# Patient Record
Sex: FEMALE | Race: WHITE | NOT HISPANIC OR LATINO | Employment: FULL TIME | URBAN - METROPOLITAN AREA
[De-identification: names, ages, dates, MRNs, and addresses within clinical notes are randomized per-mention and may not be internally consistent; named-entity substitution may affect disease eponyms.]

---

## 2017-05-04 ENCOUNTER — HOSPITAL ENCOUNTER (EMERGENCY)
Facility: HOSPITAL | Age: 47
Discharge: HOME | End: 2017-05-04
Attending: EMERGENCY MEDICINE | Admitting: EMERGENCY MEDICINE

## 2017-05-04 VITALS
RESPIRATION RATE: 18 BRPM | OXYGEN SATURATION: 100 % | TEMPERATURE: 98 F | WEIGHT: 270 LBS | DIASTOLIC BLOOD PRESSURE: 93 MMHG | SYSTOLIC BLOOD PRESSURE: 142 MMHG | HEART RATE: 83 BPM | BODY MASS INDEX: 42.38 KG/M2 | HEIGHT: 67 IN

## 2017-05-04 DIAGNOSIS — R20.2 PARESTHESIAS: Primary | ICD-10-CM

## 2017-05-04 PROCEDURE — 99283 EMERGENCY DEPT VISIT LOW MDM: CPT | Performed by: EMERGENCY MEDICINE

## 2017-05-04 PROCEDURE — 99282 EMERGENCY DEPT VISIT SF MDM: CPT

## 2017-06-02 ENCOUNTER — OFFICE VISIT (OUTPATIENT)
Dept: SURGERY | Facility: CLINIC | Age: 47
End: 2017-06-02

## 2017-06-02 VITALS — BODY MASS INDEX: 40.5 KG/M2 | HEART RATE: 79 BPM | WEIGHT: 267.2 LBS | OXYGEN SATURATION: 97 % | HEIGHT: 68 IN

## 2017-06-02 DIAGNOSIS — R10.11 RIGHT UPPER QUADRANT ABDOMINAL PAIN: ICD-10-CM

## 2017-06-02 DIAGNOSIS — L72.3 SEBACEOUS CYST: Primary | ICD-10-CM

## 2017-06-02 PROCEDURE — 99203 OFFICE O/P NEW LOW 30 MIN: CPT | Performed by: SURGERY

## 2017-06-02 RX ORDER — LORATADINE 10 MG
TABLET ORAL
Refills: 0 | COMMUNITY
Start: 2017-05-09 | End: 2022-04-21

## 2017-06-02 RX ORDER — OMEPRAZOLE 20 MG/1
CAPSULE, DELAYED RELEASE ORAL
Refills: 0 | COMMUNITY
Start: 2017-05-09 | End: 2022-04-21

## 2017-06-02 RX ORDER — MELATONIN
1000 DAILY
COMMUNITY
End: 2022-04-21

## 2017-06-02 RX ORDER — DICYCLOMINE HYDROCHLORIDE 10 MG/1
CAPSULE ORAL
Refills: 0 | COMMUNITY
Start: 2017-05-09 | End: 2022-04-21

## 2017-06-02 RX ORDER — DOXYCYCLINE 100 MG/1
TABLET ORAL
Refills: 0 | COMMUNITY
Start: 2017-05-26 | End: 2022-04-21

## 2017-06-02 NOTE — PROGRESS NOTES
General Surgery  Initial Office Visit    CC: Back cyst    HPI: The patient is a pleasant 46 y.o. year-old lady who presents today for evaluation of a palpable cyst over the left lower back.  The cyst is been present for many years, although she states that 2 weeks ago it enlarged and began to drain spontaneously after she applied some hot compresses topically.  The drainage was very malodorous, white, and filled with pus and bloody drainage as well.  She went to her primary care physician who prescribed doxycycline.  She states that is never been infected prior to this episode that began 2 weeks ago.  She has a history of a pilonidal cyst that required incision and drainage in an emergency room years back, but was never excised completely.  She also has had some right upper quadrant pain with deep palpation and has had some atypical chest pain for which she is being referred for a stress test soon as well as a right upper quadrant ultrasound.  She denies any nausea, vomiting, fevers, chills, jaundice, or scleral icterus.    Past Medical History: Skin cancer of the right arm, cervical cancer, pilonidal cyst    Past Surgical History: Hysterectomy, wisdom tooth extraction    Medications:   Dicyclomine 10 mg 4 times daily  Omeprazole 20 mg daily  Doxycycline monohydrate 100 mg 2 times daily (recently prescribed for infected cyst)    Allergies: Hydrocodone (itching), penicillin (nausea), Percocet (itching)    Family History: Aunt with breast cancer, great aunt with lung cancer and another great aunt with tongue cancer, maternal grandfather with gallbladder issues, father with coronary artery disease    Social History: Works for the [employer], , smokes 10 cigarettes daily for the last 20 years, no regular alcohol use    ROS:   Constitutional: Negative for fevers or chills  HENT: Positive for sinus pressure and sneezing; Negative for hearing loss or runny nose  Eyes: Negative for vision changes or scleral  icterus  Respiratory: Negative for cough or shortness of breath  Cardiovascular: Negative for chest pain or heart palpitations  Gastrointestinal: Negative for abdominal pain, nausea, vomiting, constipation, melena, or hematochezia  Genitourinary: Negative for hematuria or dysuria  Musculoskeletal: Negative for joint pain or back pain  Neurologic: Negative for headaches or dizziness  Psychiatric: Negative for anxiety or depression  All other systems reviewed and negative    Physical Exam:  Vitals:    06/02/17 0813   Pulse: 79   SpO2: 97%     General: No acute distress, well-nourished & well-developed  HEAD: normocephalic, atraumatic  EYES: normal conjunctiva, sclera anicteric  EARS: grossly normal hearing  NECK: supple, no thyromegaly  CARDIOVASCULAR: regular rate and rhythm  RESPIRATORY: clear to auscultation bilaterally  GASTROINTESTINAL: soft, nontender, non-distended  MUSCULOSKELETAL: normal gait and station. No gross extremity abnormalities  PSYCHIATRIC: oriented x3, normal mood and affect  BACK: Palpable sebaceous cyst of the left lower back with resulting around a small central pore, no expressible drainage and no underlying fluctuance    ASSESSMENT & PLAN  Mrs. Will is a 46-year-old lady with a recently infected sebaceous cyst of the lower back that she wishes to have electively excised.  She is scheduled to undergo a cardiac stress test as well as a gallbladder ultrasound next week at [facility], and would like to wait until both of these studies are complete before scheduling any elective surgical procedures as she would like to delineate whether or not her gallbladder will need to be removed beforehand.  I will follow up on the results of her gallbladder ultrasound and call her after reviewing the report.  If she has not heard from me 1 week after the ultrasound has been complete, she will call me with the results and I will then be happy to schedule her back cyst excision plus or minus  cholecystectomy depending on the findings of her gallbladder ultrasound.    Kaylee Álvarez MD  General and Endoscopic Surgery  [facility]    [address], [address]  [place], [place], [address]  P: [phone]  F: [phone]

## 2017-08-02 DIAGNOSIS — Z95.1 S/P CABG X 1: Primary | ICD-10-CM

## 2017-08-04 ENCOUNTER — TREATMENT (OUTPATIENT)
Dept: CARDIAC REHAB | Facility: HOSPITAL | Age: 47
End: 2017-08-04

## 2017-08-04 DIAGNOSIS — Z95.1 S/P CABG X 1: Primary | ICD-10-CM

## 2017-08-04 PROCEDURE — 93798 PHYS/QHP OP CAR RHAB W/ECG: CPT

## 2017-08-07 ENCOUNTER — TREATMENT (OUTPATIENT)
Dept: CARDIAC REHAB | Facility: HOSPITAL | Age: 47
End: 2017-08-07

## 2017-08-07 DIAGNOSIS — Z95.1 S/P CABG X 1: Primary | ICD-10-CM

## 2017-08-07 PROCEDURE — 93798 PHYS/QHP OP CAR RHAB W/ECG: CPT

## 2017-08-09 ENCOUNTER — TREATMENT (OUTPATIENT)
Dept: CARDIAC REHAB | Facility: HOSPITAL | Age: 47
End: 2017-08-09

## 2017-08-09 DIAGNOSIS — Z95.1 S/P CABG X 1: Primary | ICD-10-CM

## 2017-08-09 PROCEDURE — 93798 PHYS/QHP OP CAR RHAB W/ECG: CPT

## 2017-08-11 ENCOUNTER — TREATMENT (OUTPATIENT)
Dept: CARDIAC REHAB | Facility: HOSPITAL | Age: 47
End: 2017-08-11

## 2017-08-11 DIAGNOSIS — Z95.1 S/P CABG X 1: Primary | ICD-10-CM

## 2017-08-11 PROCEDURE — 93798 PHYS/QHP OP CAR RHAB W/ECG: CPT

## 2017-08-14 ENCOUNTER — TREATMENT (OUTPATIENT)
Dept: CARDIAC REHAB | Facility: HOSPITAL | Age: 47
End: 2017-08-14

## 2017-08-14 DIAGNOSIS — Z95.1 S/P CABG X 1: Primary | ICD-10-CM

## 2017-08-14 PROCEDURE — 93798 PHYS/QHP OP CAR RHAB W/ECG: CPT

## 2017-08-16 ENCOUNTER — TREATMENT (OUTPATIENT)
Dept: CARDIAC REHAB | Facility: HOSPITAL | Age: 47
End: 2017-08-16

## 2017-08-16 DIAGNOSIS — Z95.1 S/P CABG X 1: Primary | ICD-10-CM

## 2017-08-16 PROCEDURE — 93798 PHYS/QHP OP CAR RHAB W/ECG: CPT

## 2017-08-18 ENCOUNTER — TREATMENT (OUTPATIENT)
Dept: CARDIAC REHAB | Facility: HOSPITAL | Age: 47
End: 2017-08-18

## 2017-08-18 DIAGNOSIS — Z95.1 S/P CABG X 1: Primary | ICD-10-CM

## 2017-08-18 PROCEDURE — 93798 PHYS/QHP OP CAR RHAB W/ECG: CPT

## 2017-08-21 ENCOUNTER — TREATMENT (OUTPATIENT)
Dept: CARDIAC REHAB | Facility: HOSPITAL | Age: 47
End: 2017-08-21

## 2017-08-21 DIAGNOSIS — Z95.1 S/P CABG X 1: Primary | ICD-10-CM

## 2017-08-21 PROCEDURE — 93798 PHYS/QHP OP CAR RHAB W/ECG: CPT

## 2017-08-23 ENCOUNTER — TREATMENT (OUTPATIENT)
Dept: CARDIAC REHAB | Facility: HOSPITAL | Age: 47
End: 2017-08-23

## 2017-08-23 DIAGNOSIS — Z95.1 S/P CABG X 1: Primary | ICD-10-CM

## 2017-08-23 PROCEDURE — 93798 PHYS/QHP OP CAR RHAB W/ECG: CPT

## 2017-08-25 ENCOUNTER — TREATMENT (OUTPATIENT)
Dept: CARDIAC REHAB | Facility: HOSPITAL | Age: 47
End: 2017-08-25

## 2017-08-25 DIAGNOSIS — Z95.1 S/P CABG X 1: Primary | ICD-10-CM

## 2017-08-25 PROCEDURE — 93798 PHYS/QHP OP CAR RHAB W/ECG: CPT

## 2017-08-28 ENCOUNTER — TREATMENT (OUTPATIENT)
Dept: CARDIAC REHAB | Facility: HOSPITAL | Age: 47
End: 2017-08-28

## 2017-08-28 DIAGNOSIS — Z95.1 S/P CABG X 1: Primary | ICD-10-CM

## 2017-08-28 PROCEDURE — 93798 PHYS/QHP OP CAR RHAB W/ECG: CPT

## 2017-08-30 ENCOUNTER — TREATMENT (OUTPATIENT)
Dept: CARDIAC REHAB | Facility: HOSPITAL | Age: 47
End: 2017-08-30

## 2017-08-30 DIAGNOSIS — Z95.1 S/P CABG X 1: Primary | ICD-10-CM

## 2017-08-30 PROCEDURE — 93798 PHYS/QHP OP CAR RHAB W/ECG: CPT

## 2017-09-01 ENCOUNTER — TREATMENT (OUTPATIENT)
Dept: CARDIAC REHAB | Facility: HOSPITAL | Age: 47
End: 2017-09-01

## 2017-09-01 DIAGNOSIS — Z95.1 S/P CABG X 1: Primary | ICD-10-CM

## 2017-09-01 PROCEDURE — 93798 PHYS/QHP OP CAR RHAB W/ECG: CPT

## 2017-09-06 ENCOUNTER — TREATMENT (OUTPATIENT)
Dept: CARDIAC REHAB | Facility: HOSPITAL | Age: 47
End: 2017-09-06

## 2017-09-06 DIAGNOSIS — Z95.1 S/P CABG X 1: Primary | ICD-10-CM

## 2017-09-06 PROCEDURE — 93798 PHYS/QHP OP CAR RHAB W/ECG: CPT

## 2017-09-08 ENCOUNTER — TREATMENT (OUTPATIENT)
Dept: CARDIAC REHAB | Facility: HOSPITAL | Age: 47
End: 2017-09-08

## 2017-09-08 DIAGNOSIS — Z95.1 S/P CABG X 1: Primary | ICD-10-CM

## 2017-09-08 PROCEDURE — 93798 PHYS/QHP OP CAR RHAB W/ECG: CPT

## 2017-09-11 ENCOUNTER — TREATMENT (OUTPATIENT)
Dept: CARDIAC REHAB | Facility: HOSPITAL | Age: 47
End: 2017-09-11

## 2017-09-11 DIAGNOSIS — Z95.1 S/P CABG X 1: Primary | ICD-10-CM

## 2017-09-11 PROCEDURE — 93798 PHYS/QHP OP CAR RHAB W/ECG: CPT

## 2017-09-13 ENCOUNTER — TREATMENT (OUTPATIENT)
Dept: CARDIAC REHAB | Facility: HOSPITAL | Age: 47
End: 2017-09-13

## 2017-09-13 DIAGNOSIS — Z95.1 S/P CABG X 1: Primary | ICD-10-CM

## 2017-09-13 PROCEDURE — 93798 PHYS/QHP OP CAR RHAB W/ECG: CPT

## 2017-09-18 ENCOUNTER — TREATMENT (OUTPATIENT)
Dept: CARDIAC REHAB | Facility: HOSPITAL | Age: 47
End: 2017-09-18

## 2017-09-18 DIAGNOSIS — Z95.1 S/P CABG X 1: Primary | ICD-10-CM

## 2017-09-18 PROCEDURE — 93798 PHYS/QHP OP CAR RHAB W/ECG: CPT

## 2017-09-20 ENCOUNTER — TREATMENT (OUTPATIENT)
Dept: CARDIAC REHAB | Facility: HOSPITAL | Age: 47
End: 2017-09-20

## 2017-09-20 DIAGNOSIS — Z95.1 S/P CABG X 1: Primary | ICD-10-CM

## 2017-09-20 PROCEDURE — 93798 PHYS/QHP OP CAR RHAB W/ECG: CPT

## 2017-09-22 ENCOUNTER — TREATMENT (OUTPATIENT)
Dept: CARDIAC REHAB | Facility: HOSPITAL | Age: 47
End: 2017-09-22

## 2017-09-22 DIAGNOSIS — Z95.1 S/P CABG X 1: Primary | ICD-10-CM

## 2017-09-22 PROCEDURE — 93798 PHYS/QHP OP CAR RHAB W/ECG: CPT

## 2017-09-25 ENCOUNTER — TREATMENT (OUTPATIENT)
Dept: CARDIAC REHAB | Facility: HOSPITAL | Age: 47
End: 2017-09-25

## 2017-09-25 DIAGNOSIS — Z95.1 S/P CABG X 1: Primary | ICD-10-CM

## 2017-09-25 PROCEDURE — 93798 PHYS/QHP OP CAR RHAB W/ECG: CPT

## 2017-09-27 ENCOUNTER — TREATMENT (OUTPATIENT)
Dept: CARDIAC REHAB | Facility: HOSPITAL | Age: 47
End: 2017-09-27

## 2017-09-27 DIAGNOSIS — Z95.1 S/P CABG X 1: Primary | ICD-10-CM

## 2017-09-27 PROCEDURE — 93798 PHYS/QHP OP CAR RHAB W/ECG: CPT

## 2017-09-29 ENCOUNTER — APPOINTMENT (OUTPATIENT)
Dept: CARDIAC REHAB | Facility: HOSPITAL | Age: 47
End: 2017-09-29

## 2017-10-02 ENCOUNTER — APPOINTMENT (OUTPATIENT)
Dept: CARDIAC REHAB | Facility: HOSPITAL | Age: 47
End: 2017-10-02

## 2017-10-04 ENCOUNTER — APPOINTMENT (OUTPATIENT)
Dept: CARDIAC REHAB | Facility: HOSPITAL | Age: 47
End: 2017-10-04

## 2022-04-21 ENCOUNTER — OFFICE VISIT (OUTPATIENT)
Dept: CARDIOLOGY | Facility: CLINIC | Age: 52
End: 2022-04-21

## 2022-04-21 VITALS
OXYGEN SATURATION: 99 % | SYSTOLIC BLOOD PRESSURE: 138 MMHG | WEIGHT: 279 LBS | BODY MASS INDEX: 42.28 KG/M2 | HEART RATE: 87 BPM | DIASTOLIC BLOOD PRESSURE: 82 MMHG | HEIGHT: 68 IN

## 2022-04-21 DIAGNOSIS — Z95.1 S/P CABG (CORONARY ARTERY BYPASS GRAFT): ICD-10-CM

## 2022-04-21 DIAGNOSIS — I10 PRIMARY HYPERTENSION: ICD-10-CM

## 2022-04-21 DIAGNOSIS — Z01.810 PREOP CARDIOVASCULAR EXAM: Primary | ICD-10-CM

## 2022-04-21 DIAGNOSIS — I25.810 CORONARY ARTERY DISEASE INVOLVING CORONARY BYPASS GRAFT OF NATIVE HEART WITHOUT ANGINA PECTORIS: ICD-10-CM

## 2022-04-21 DIAGNOSIS — E66.2 CLASS 3 OBESITY WITH ALVEOLAR HYPOVENTILATION, SERIOUS COMORBIDITY, AND BODY MASS INDEX (BMI) OF 40.0 TO 44.9 IN ADULT: ICD-10-CM

## 2022-04-21 DIAGNOSIS — E11.59 TYPE 2 DIABETES MELLITUS WITH OTHER CIRCULATORY COMPLICATION, WITHOUT LONG-TERM CURRENT USE OF INSULIN: ICD-10-CM

## 2022-04-21 DIAGNOSIS — E78.2 MIXED HYPERLIPIDEMIA: ICD-10-CM

## 2022-04-21 PROBLEM — E66.813 CLASS 3 OBESITY WITH ALVEOLAR HYPOVENTILATION, SERIOUS COMORBIDITY, AND BODY MASS INDEX (BMI) OF 40.0 TO 44.9 IN ADULT: Status: ACTIVE | Noted: 2022-04-21

## 2022-04-21 PROBLEM — E11.9 DIABETES MELLITUS: Status: ACTIVE | Noted: 2022-04-21

## 2022-04-21 PROCEDURE — 93000 ELECTROCARDIOGRAM COMPLETE: CPT | Performed by: INTERNAL MEDICINE

## 2022-04-21 PROCEDURE — 99204 OFFICE O/P NEW MOD 45 MIN: CPT | Performed by: INTERNAL MEDICINE

## 2022-04-21 RX ORDER — LINAGLIPTIN AND METFORMIN HYDROCHLORIDE 2.5; 1 MG/1; MG/1
2 TABLET, FILM COATED ORAL DAILY
COMMUNITY
Start: 2022-04-05

## 2022-04-21 RX ORDER — AMLODIPINE BESYLATE 5 MG/1
2 TABLET ORAL DAILY
COMMUNITY
Start: 2022-03-23

## 2022-04-21 RX ORDER — EMPAGLIFLOZIN 25 MG/1
25 TABLET, FILM COATED ORAL DAILY
COMMUNITY
Start: 2022-04-05

## 2022-04-21 RX ORDER — ATORVASTATIN CALCIUM 40 MG/1
40 TABLET, FILM COATED ORAL DAILY
COMMUNITY
Start: 2022-02-16

## 2022-04-21 RX ORDER — METOPROLOL SUCCINATE 25 MG/1
75 TABLET, EXTENDED RELEASE ORAL DAILY
COMMUNITY
Start: 2022-02-08

## 2022-04-21 NOTE — PROGRESS NOTES
Date of Office Visit: 2022  Encounter Provider: Monica Leyva MD  Place of Service: [facility] CARDIOLOGY  Patient Name: Consuelo Goodrich  :1970      Patient ID:  Consuelo Goodrich is a 51 y.o. female is here for preoperative evaluation.          History of Present Illness    She has history of CAD, status post CABG with LIMA to LAD 2017, hypertension, hyperlipidemia, obesity, diabetes mellitus.  She is here for preoperative evaluation.    In 2017 she did stress nuclear perfusion study performed which showed mid and distal anteroseptal and apical ischemia.  She went on for a cardiac catheterization done 2017 which showed critical ostial LAD stenosis with 80% mid LAD stenosis, patent circumflex, patent left main, 30% first OM stenosis, 40 to 50% mid second OM stenosis, codominant circumflex and RCA with 50% mid RCA stenosis.  Echocardiogram done 2017 showed ejection fraction 57% with grade 1 diastolic dysfunction.    In her family history, her mom and dad both have hypertension as her grandparents.  Her dad also has diabetes.  She is , has 1 child, works as a , quit smoking , has 1 caffeinated beverage per day and uses no alcohol or drugs.    Labs done  10/27/2021 show glucose 153, otherwise normal CMP, hemoglobin A1c 7.1, normal CBC, total cholesterol 123, HDL 41, LDL 56, VLDL 26, triglycerides 129.  Labs on 4/15/2022 show normal CBC.    She is currently scheduled to have a mass removed from her left breast on Monday with Dr. Chelsie Bui at [facility].  She was told that this is benign.    She has no chest pain or pressure.  She does not feel her heart racing or skipping.  She has no orthopnea or PND.  She has no exertional dyspnea.  She has had no syncope or falls.  She is taking her medications as directed without difficulty.  She currently is not exercising and has not since COVID.    Past Medical History:   Diagnosis Date   • Basal  cell carcinoma 2016    right arm    • CAD (coronary artery disease)     stated in 2021 Dr. Oz Sousa office note   • Cervical cancer (HCC)    • Essential hypertension     stated in 2021 Dr. Oz Sousa office note   • GERD (gastroesophageal reflux disease)    • Hyperlipidemia LDL goal <70     stated in 2021 Dr. Oz Sousa office note   • Obesity    • Type 2 diabetes mellitus with vascular disease (HCC)     stated in 2021 Dr. Oz Sousa office note         Past Surgical History:   Procedure Laterality Date   • BASAL CELL CARCINOMA EXCISION Right 2016    Right arm basal cell carcinoma excision-Dr. Dejesus   • CARDIAC CATHETERIZATION Left 2017    Tanner Escobedo   •  SECTION N/A    • CORONARY ARTERY BYPASS GRAFT  06/09/2017    x1 with lefft internal mammary artery to mid LAD, Tanner Voss   • TOTAL ABDOMINAL HYSTERECTOMY Bilateral     with ovaries in tact-Dr. Hussein   • WISDOM TOOTH EXTRACTION N/A        Current Outpatient Medications on File Prior to Visit   Medication Sig Dispense Refill   • amLODIPine (NORVASC) 5 MG tablet Take 2 tablets by mouth Daily.     • atorvastatin (LIPITOR) 40 MG tablet Take 40 mg by mouth Daily.     • Jardiance 25 MG tablet tablet Take 25 mg by mouth Daily.     • Jentadueto 2.5-1000 MG tablet Take 2 tablets by mouth Daily.     • metoprolol succinate XL (TOPROL-XL) 25 MG 24 hr tablet Take 75 mg by mouth Daily.     • [DISCONTINUED] cholecalciferol (VITAMIN D3) 1000 UNITS tablet Take 1,000 Units by mouth Daily.     • [DISCONTINUED] dicyclomine (BENTYL) 10 MG capsule TK 1 C PO QID B MEALS AND ATN  0   • [DISCONTINUED] doxycycline (ADOXA) 100 MG tablet TK 1 T PO  QAM AND TK 1 T PO QPM WF FOR 10 DAYS  0   • [DISCONTINUED] GAS RELIEF EXTRA STRENGTH 125 MG chewable tablet CHEW 1 T PO Q 6 H PRN FLATULENCE  0   • [DISCONTINUED] mupirocin (BACTROBAN) 2 % ointment APPLY BID AA ON THE BACK FOR WOUND HEALING  1   • [DISCONTINUED] omeprazole  "(priLOSEC) 20 MG capsule TK 1 C PO D  0     No current facility-administered medications on file prior to visit.       Social History     Socioeconomic History   • Marital status:    • Number of children: 1   Tobacco Use   • Smoking status: Former Smoker     Packs/day: 0.50     Years: 20.00     Pack years: 10.00     Types: Cigarettes     Quit date:      Years since quittin.3   • Smokeless tobacco: Never Used   • Tobacco comment: caffine: 1 soda daily   Substance and Sexual Activity   • Alcohol use: No   • Drug use: No   • Sexual activity: Defer           ROS    Procedures    ECG 12 Lead    Date/Time: 2022 1:23 PM  Performed by: Monica Leyva MD  Authorized by: Monica Leyva MD   Comparison: compared with previous ECG   Similar to previous ECG  Rhythm: sinus rhythm    Clinical impression: normal ECG                Objective:      Vitals:    22 1301   BP: 138/82   Pulse: 87   SpO2: 99%   Weight: 127 kg (279 lb)   Height: 172.7 cm (68\")     Body mass index is 42.42 kg/m².    Vitals reviewed.   Constitutional:       General: Not in acute distress.     Appearance: Well-developed. Not diaphoretic.   Eyes:      General: No scleral icterus.     Conjunctiva/sclera: Conjunctivae normal.   HENT:      Head: Normocephalic and atraumatic.   Neck:      Thyroid: No thyromegaly.      Vascular: No carotid bruit or JVD.      Lymphadenopathy: No cervical adenopathy.   Pulmonary:      Effort: Pulmonary effort is normal. No respiratory distress.      Breath sounds: Normal breath sounds. No wheezing. No rhonchi. No rales.   Chest:      Chest wall: Not tender to palpatation.   Cardiovascular:      Normal rate. Regular rhythm.      Murmurs: There is a grade 2/6 midsystolic murmur at the URSB and ULSB.      No gallop.   Pulses:     Intact distal pulses.   Edema:     Peripheral edema absent.   Abdominal:      General: Bowel sounds are normal. There is no distension or abdominal bruit.      Palpations: " Abdomen is soft. There is no abdominal mass.      Tenderness: There is no abdominal tenderness.   Musculoskeletal:         General: No deformity.      Extremities: No clubbing present.     Cervical back: Neck supple. Skin:     General: Skin is warm and dry. There is no cyanosis.      Coloration: Skin is not pale.      Findings: No rash.   Neurological:      Mental Status: Alert and oriented to person, place, and time.      Cranial Nerves: No cranial nerve deficit.   Psychiatric:         Judgment: Judgment normal.         Lab Review:       Assessment:      Diagnosis Plan   1. Preop cardiovascular exam  Adult Transthoracic Echo Complete W/ Cont if Necessary Per Protocol   2. Coronary artery disease involving coronary bypass graft of native heart without angina pectoris  Adult Transthoracic Echo Complete W/ Cont if Necessary Per Protocol   3. S/P CABG (coronary artery bypass graft)  Adult Transthoracic Echo Complete W/ Cont if Necessary Per Protocol   4. Primary hypertension  Adult Transthoracic Echo Complete W/ Cont if Necessary Per Protocol   5. Mixed hyperlipidemia  Adult Transthoracic Echo Complete W/ Cont if Necessary Per Protocol   6. Type 2 diabetes mellitus with other circulatory complication, without long-term current use of insulin ([facility])  Adult Transthoracic Echo Complete W/ Cont if Necessary Per Protocol   7. Class 3 obesity with alveolar hypoventilation, serious comorbidity, and body mass index (BMI) of 40.0 to 44.9 in adult ([facility])       1. CAD, status post CABG in 2017, no angina.  Does not need a stress study at this time.  2. Hypertension, goal less than 120/80.  Her blood pressure has been well controlled.  3. Hyperlipidemia, on atorvastatin.  4. Diabetes mellitus type 2, on Jentadueto and Jardiance.  5. Obesity  6. Preoperative evaluation for left breast surgery.  Her Sebastian's revised cardiovascular risk index gives her a 0.9% risk of cardiovascular complications with surgery.  She may proceed without  further testing at this time.  7. Murmur on examination, soft, set up echocardiogram.     Plan:       See Allie in 1 year, no medication changes, advised 30 minutes of cardiovascular exercise daily, she does have slight edema with 10 mg of amlodipine daily but she is tolerating it well and is allergic to other medications.  She is cleared for surgery without further cardiovascular testing at this time.

## 2022-05-17 ENCOUNTER — TELEPHONE (OUTPATIENT)
Dept: CARDIOLOGY | Facility: CLINIC | Age: 52
End: 2022-05-17

## 2022-05-17 ENCOUNTER — HOSPITAL ENCOUNTER (OUTPATIENT)
Dept: CARDIOLOGY | Facility: HOSPITAL | Age: 52
Discharge: HOME | End: 2022-05-17
Admitting: INTERNAL MEDICINE

## 2022-05-17 VITALS
HEIGHT: 68 IN | OXYGEN SATURATION: 95 % | SYSTOLIC BLOOD PRESSURE: 150 MMHG | BODY MASS INDEX: 42.28 KG/M2 | DIASTOLIC BLOOD PRESSURE: 84 MMHG | WEIGHT: 279 LBS | HEART RATE: 83 BPM

## 2022-05-17 DIAGNOSIS — Z01.810 PREOP CARDIOVASCULAR EXAM: ICD-10-CM

## 2022-05-17 DIAGNOSIS — E78.2 MIXED HYPERLIPIDEMIA: ICD-10-CM

## 2022-05-17 DIAGNOSIS — I25.810 CORONARY ARTERY DISEASE INVOLVING CORONARY BYPASS GRAFT OF NATIVE HEART WITHOUT ANGINA PECTORIS: ICD-10-CM

## 2022-05-17 DIAGNOSIS — E11.59 TYPE 2 DIABETES MELLITUS WITH OTHER CIRCULATORY COMPLICATION, WITHOUT LONG-TERM CURRENT USE OF INSULIN: ICD-10-CM

## 2022-05-17 DIAGNOSIS — Z95.1 S/P CABG (CORONARY ARTERY BYPASS GRAFT): ICD-10-CM

## 2022-05-17 DIAGNOSIS — I10 PRIMARY HYPERTENSION: ICD-10-CM

## 2022-05-17 LAB
AORTIC ARCH: 2.8 CM
ASCENDING AORTA: 3.4 CM
BH CV ECHO MEAS - ACS: 2.26 CM
BH CV ECHO MEAS - AO MAX PG: 14.3 MMHG
BH CV ECHO MEAS - AO MEAN PG: 8 MMHG
BH CV ECHO MEAS - AO ROOT DIAM: 3.8 CM
BH CV ECHO MEAS - AO V2 MAX: 188.9 CM/SEC
BH CV ECHO MEAS - AO V2 VTI: 37.7 CM
BH CV ECHO MEAS - AVA(I,D): 2.07 CM2
BH CV ECHO MEAS - EDV(CUBED): 86.9 ML
BH CV ECHO MEAS - EDV(MOD-SP2): 65 ML
BH CV ECHO MEAS - EDV(MOD-SP4): 92 ML
BH CV ECHO MEAS - EF(MOD-BP): 54.7 %
BH CV ECHO MEAS - EF(MOD-SP2): 55.4 %
BH CV ECHO MEAS - EF(MOD-SP4): 55.4 %
BH CV ECHO MEAS - ESV(CUBED): 20.5 ML
BH CV ECHO MEAS - ESV(MOD-SP2): 29 ML
BH CV ECHO MEAS - ESV(MOD-SP4): 41 ML
BH CV ECHO MEAS - FS: 38.2 %
BH CV ECHO MEAS - IVS/LVPW: 0.94 CM
BH CV ECHO MEAS - IVSD: 0.96 CM
BH CV ECHO MEAS - LAT PEAK E' VEL: 8.5 CM/SEC
BH CV ECHO MEAS - LV DIASTOLIC VOL/BSA (35-75): 39.1 CM2
BH CV ECHO MEAS - LV MASS(C)D: 147.7 GRAMS
BH CV ECHO MEAS - LV MAX PG: 7.2 MMHG
BH CV ECHO MEAS - LV MEAN PG: 3.5 MMHG
BH CV ECHO MEAS - LV SYSTOLIC VOL/BSA (12-30): 17.4 CM2
BH CV ECHO MEAS - LV V1 MAX: 134.3 CM/SEC
BH CV ECHO MEAS - LV V1 VTI: 29.9 CM
BH CV ECHO MEAS - LVIDD: 4.4 CM
BH CV ECHO MEAS - LVIDS: 2.7 CM
BH CV ECHO MEAS - LVOT AREA: 2.6 CM2
BH CV ECHO MEAS - LVOT DIAM: 1.82 CM
BH CV ECHO MEAS - LVPWD: 1.02 CM
BH CV ECHO MEAS - MED PEAK E' VEL: 7.6 CM/SEC
BH CV ECHO MEAS - MV A DUR: 0.1 SEC
BH CV ECHO MEAS - MV A MAX VEL: 84.6 CM/SEC
BH CV ECHO MEAS - MV DEC SLOPE: 360.3 CM/SEC2
BH CV ECHO MEAS - MV DEC TIME: 0.12 MSEC
BH CV ECHO MEAS - MV E MAX VEL: 70.4 CM/SEC
BH CV ECHO MEAS - MV E/A: 0.83
BH CV ECHO MEAS - MV MAX PG: 6.3 MMHG
BH CV ECHO MEAS - MV MEAN PG: 2.1 MMHG
BH CV ECHO MEAS - MV P1/2T: 67.1 MSEC
BH CV ECHO MEAS - MV V2 VTI: 30 CM
BH CV ECHO MEAS - MVA(P1/2T): 3.3 CM2
BH CV ECHO MEAS - MVA(VTI): 2.6 CM2
BH CV ECHO MEAS - PA ACC TIME: 0.09 SEC
BH CV ECHO MEAS - PA PR(ACCEL): 38.6 MMHG
BH CV ECHO MEAS - PA V2 MAX: 95 CM/SEC
BH CV ECHO MEAS - PULM A REVS DUR: 0.09 SEC
BH CV ECHO MEAS - PULM A REVS VEL: 28 CM/SEC
BH CV ECHO MEAS - PULM DIAS VEL: 45.4 CM/SEC
BH CV ECHO MEAS - PULM S/D: 1.11
BH CV ECHO MEAS - PULM SYS VEL: 50.5 CM/SEC
BH CV ECHO MEAS - QP/QS: 0.94
BH CV ECHO MEAS - RAP SYSTOLE: 3 MMHG
BH CV ECHO MEAS - RV MAX PG: 3.3 MMHG
BH CV ECHO MEAS - RV V1 MAX: 91.2 CM/SEC
BH CV ECHO MEAS - RV V1 VTI: 23.4 CM
BH CV ECHO MEAS - RVOT DIAM: 2 CM
BH CV ECHO MEAS - RVSP: 35.2 MMHG
BH CV ECHO MEAS - SI(MOD-SP2): 15.3 ML/M2
BH CV ECHO MEAS - SI(MOD-SP4): 21.7 ML/M2
BH CV ECHO MEAS - SUP REN AO DIAM: 2 CM
BH CV ECHO MEAS - SV(LVOT): 78.1 ML
BH CV ECHO MEAS - SV(MOD-SP2): 36 ML
BH CV ECHO MEAS - SV(MOD-SP4): 51 ML
BH CV ECHO MEAS - SV(RVOT): 73.8 ML
BH CV ECHO MEAS - TAPSE (>1.6): 2.39 CM
BH CV ECHO MEAS - TR MAX PG: 32.2 MMHG
BH CV ECHO MEAS - TR MAX VEL: 283.5 CM/SEC
BH CV ECHO MEASUREMENTS AVERAGE E/E' RATIO: 8.75
BH CV VAS BP RIGHT ARM: NORMAL MMHG
BH CV XLRA - RV BASE: 2.9 CM
BH CV XLRA - RV LENGTH: 7.6 CM
BH CV XLRA - RV MID: 3.2 CM
BH CV XLRA - TDI S': 10.3 CM/SEC
LEFT ATRIUM VOLUME INDEX: 11.7 ML/M2
MAXIMAL PREDICTED HEART RATE: 167 BPM
SINUS: 3.1 CM
STJ: 3.1 CM
STRESS TARGET HR: 142 BPM

## 2022-05-17 PROCEDURE — 93306 TTE W/DOPPLER COMPLETE: CPT | Performed by: INTERNAL MEDICINE

## 2022-05-17 PROCEDURE — 25010000002 PERFLUTREN (DEFINITY) 8.476 MG IN SODIUM CHLORIDE (PF) 0.9 % 10 ML INJECTION: Performed by: INTERNAL MEDICINE

## 2022-05-17 PROCEDURE — 93306 TTE W/DOPPLER COMPLETE: CPT

## 2022-05-17 RX ADMIN — PERFLUTREN 1.5 ML: 6.52 INJECTION, SUSPENSION INTRAVENOUS at 07:59

## 2023-04-27 ENCOUNTER — OFFICE VISIT (OUTPATIENT)
Dept: CARDIOLOGY | Facility: CLINIC | Age: 53
End: 2023-04-27
Payer: COMMERCIAL

## 2023-04-27 VITALS
DIASTOLIC BLOOD PRESSURE: 92 MMHG | WEIGHT: 275 LBS | BODY MASS INDEX: 41.68 KG/M2 | HEIGHT: 68 IN | SYSTOLIC BLOOD PRESSURE: 142 MMHG | HEART RATE: 65 BPM

## 2023-04-27 DIAGNOSIS — R01.1 HEART MURMUR: ICD-10-CM

## 2023-04-27 DIAGNOSIS — R06.02 SHORTNESS OF BREATH: ICD-10-CM

## 2023-04-27 DIAGNOSIS — I10 PRIMARY HYPERTENSION: ICD-10-CM

## 2023-04-27 DIAGNOSIS — I25.810 CORONARY ARTERY DISEASE INVOLVING CORONARY BYPASS GRAFT OF NATIVE HEART WITHOUT ANGINA PECTORIS: Primary | ICD-10-CM

## 2023-04-27 DIAGNOSIS — E11.59 TYPE 2 DIABETES MELLITUS WITH OTHER CIRCULATORY COMPLICATION, WITHOUT LONG-TERM CURRENT USE OF INSULIN: ICD-10-CM

## 2023-04-27 DIAGNOSIS — R60.0 LOWER EXTREMITY EDEMA: ICD-10-CM

## 2023-04-27 DIAGNOSIS — E78.2 MIXED HYPERLIPIDEMIA: ICD-10-CM

## 2023-04-27 PROCEDURE — 99214 OFFICE O/P EST MOD 30 MIN: CPT | Performed by: NURSE PRACTITIONER

## 2023-04-27 PROCEDURE — 93000 ELECTROCARDIOGRAM COMPLETE: CPT | Performed by: NURSE PRACTITIONER

## 2023-04-27 RX ORDER — ASPIRIN 81 MG/1
81 TABLET, CHEWABLE ORAL DAILY
COMMUNITY

## 2023-04-27 NOTE — PROGRESS NOTES
Date of Office Visit: 2023  Encounter Provider: PANKAJ York  Place of Service: [facility] CARDIOLOGY  Patient Name: Consuelo Goodrich  :1970  Primary Cardiologist: Dr. Monica Leyva     Chief Complaint   Patient presents with   • Coronary Artery Disease   • Annual Exam   :     HPI: Consuelo Goodrich is a 52 y.o. female who presents today for annual cardiac follow-up visit.  She is a new patient to me and I have reviewed her medical records.    She has been diagnosed with hypertension, hyperlipidemia, diabetes mellitus, and obesity.  She quit cigarette smoking in .    In 2017, she was experiencing left arm pain and burping (anginal symptoms).  Myocardial perfusion study was abnormal showing mid and distal anteroseptal and apical ischemia.  Cardiac catheterization showed multivessel coronary artery disease and she underwent CABG (LIMA to LAD) at [facility].    In 2022, she establish care with Dr. Monica Leyva.  She needs perioperative cardiac risk assessment for left breast removal surgery at [facility].  She was felt to be at acceptable risk and underwent the surgery.  She was noted to have a mild heart murmur.  Echocardiogram showed normal EF and normal valvular function.    In 2022, she had COVID-19 virus.    She presents today for annual follow-up visit.  She denies any of the anginal symptoms.  Sometimes when talking in long sentences she will feel short of breath.  She is also sensitive to smoke.  For example, if she sitting in her car and the windows are down and someone smoking 2 or 3 cars ahead she will become acutely short of breath.  She said that she was a previous cigarette smoker so this surprises her.  She denies chest pain, PND, orthopnea, palpitations, dizziness, syncope, or bleeding.  Her blood pressure was elevated on both checks.  She has chronic lower extremity edema which is not bothersome to her.    Past Medical  History:   Diagnosis Date   • Basal cell carcinoma 2016    right arm    • CAD (coronary artery disease)     stated in 2021 Dr. Oz Sousa office note   • Cervical cancer    • Essential hypertension     stated in 2021 Dr. Oz Sousa office note   • GERD (gastroesophageal reflux disease)    • Hyperlipidemia LDL goal <70     stated in 2021 Dr. Oz Sousa office note   • Obesity    • Type 2 diabetes mellitus with vascular disease     stated in 2021 Dr. Oz Sousa office note       Past Surgical History:   Procedure Laterality Date   • BASAL CELL CARCINOMA EXCISION Right 2016    Right arm basal cell carcinoma excision-Dr. Dejesus   • CARDIAC CATHETERIZATION Left 2017    Tanner Escobedo   •  SECTION N/A    • CORONARY ARTERY BYPASS GRAFT  06/09/2017    x1 with lefft internal mammary artery to mid LAD, Tanner Voss   • TOTAL ABDOMINAL HYSTERECTOMY Bilateral     with ovaries in tact-Dr. Hussein   • WISDOM TOOTH EXTRACTION N/A        Social History     Socioeconomic History   • Marital status:    • Number of children: 1   Tobacco Use   • Smoking status: Former     Packs/day: 0.50     Years: 20.00     Pack years: 10.00     Types: Cigarettes     Quit date:      Years since quittin.3   • Smokeless tobacco: Never   • Tobacco comments:     caffine: 1 soda daily   Substance and Sexual Activity   • Alcohol use: No   • Drug use: No   • Sexual activity: Defer       Family History   Problem Relation Age of Onset   • Hypertension Mother    • Cancer Father    • Hypertension Father    • Diabetes Father    • Cancer Maternal Aunt         Breast   • Sudden death Paternal Uncle    • Hypertension Maternal Grandmother    • Hypertension Maternal Grandfather    • Stroke Maternal Grandfather    • Diabetes Paternal Grandmother    • Hypertension Paternal Grandmother    • Hypertension Paternal Grandfather    • Heart disease Paternal Grandfather    • Cancer Other          "Tongue   • Cancer Other         Lung       The following portion of the patient's history were reviewed and updated as appropriate: past medical history, past surgical history, past social history, past family history, allergies, current medications, and problem list.    Review of Systems   Constitutional: Negative.   Cardiovascular: Positive for leg swelling.   Respiratory: Negative.    Hematologic/Lymphatic: Negative.    Neurological: Negative.        Allergies   Allergen Reactions   • Sulfamethoxazole-Trimethoprim Rash   • Adhesive Tape Other (See Comments)     Blisters; from a bandage    • Cephalexin Hives   • Clindamycin Other (See Comments)     Unsure   • Hydrocodone Itching   • Latex Other (See Comments)     Raw, blisters   • Lisinopril Swelling     angioedema   • Penicillins Nausea Only   • Percocet [Oxycodone-Acetaminophen] Itching         Current Outpatient Medications:   •  amLODIPine (NORVASC) 5 MG tablet, Take 2 tablets by mouth Daily., Disp: , Rfl:   •  aspirin 81 MG chewable tablet, Chew 1 tablet Daily., Disp: , Rfl:   •  atorvastatin (LIPITOR) 40 MG tablet, Take 1 tablet by mouth Daily., Disp: , Rfl:   •  Jardiance 25 MG tablet tablet, Take 1 tablet by mouth Daily., Disp: , Rfl:   •  Jentadueto 2.5-1000 MG tablet, Take 2 tablets by mouth Daily., Disp: , Rfl:   •  metoprolol succinate XL (TOPROL-XL) 25 MG 24 hr tablet, Take 3 tablets by mouth Daily., Disp: , Rfl:          Objective:     Vitals:    04/27/23 0859 04/27/23 0927   BP: 150/100 142/92   BP Location: Left arm Left arm   Patient Position: Sitting Sitting   Cuff Size: Adult Large Adult   Pulse: 65    Weight: 125 kg (275 lb)    Height: 172.7 cm (67.99\")      Body mass index is 41.82 kg/m².    PHYSICAL EXAM:    Vitals Reviewed.   General Appearance: No acute distress, well developed and well nourished. Obese.   Eyes: Glasses.   HENT: No hearing loss noted.    Respiratory: No signs of respiratory distress. Respiration rhythm and depth normal.  " Clear to auscultation.   Cardiovascular:  Jugular Venous Pressure: Normal  Heart Rate and Rhythm: Normal, Heart Sounds: Normal S1 and S2. No S3 or S4 noted.  Murmurs: RUSB grade 1/6 systolic murmur present.  Carotid Arteries: No bruit noted.  Lower Extremities: Bilateral trace lower extremity edema noted.  Musculoskeletal: Normal movement of extremities.  Skin: General appearance normal.    Psychiatric: Patient alert and oriented to person, place, and time. Speech and behavior appropriate. Normal mood and affect.       ECG 12 Lead    Date/Time: 4/27/2023 8:58 AM  Performed by: Allie Maradiaga APRN  Authorized by: Allie Maradiaga APRN   Comparison: compared with previous ECG from 4/21/2022  Similar to previous ECG  Rhythm: sinus rhythm  Rate: normal  BPM: 65  Conduction: conduction normal  QRS axis: normal  Other findings: non-specific ST-T wave changes    Clinical impression: non-specific ECG              Assessment:       Diagnosis Plan   1. Coronary artery disease involving coronary bypass graft of native heart without angina pectoris        2. Heart murmur        3. Primary hypertension        4. Lower extremity edema        5. Mixed hyperlipidemia        6. Shortness of breath        7. Type 2 diabetes mellitus with other circulatory complication, without long-term current use of insulin               Plan:       1.  Coronary Artery Disease: Status post CABG x1 (LIMA to LAD) in 2017.  Anginal symptoms for left arm pain and belching.  Denies any symptoms.  Risk factor modification discussed.  Continue aspirin and atorvastatin.  She says she is going to try to start exercising.    2.  Heart Murmur: Mild heart murmur present on exam.  This was noted last year as well.  Echocardiogram showed no valvular abnormalities.  Innocent heart murmur.    3.  Hypertension: BP elevated.  She is experiencing lower extremity edema.  I recommended monitoring blood pressure at home, following low-sodium diet, elevation of legs,  and updating me in 1 to 2 weeks.  If BP remains elevated, I may decrease her amlodipine to 5 mg daily to help with the swelling and add a diuretic.    5.  Hyperlipidemia: Goal LDL less than 70 recommended.  Managed by her PCP and she will continue on atorvastatin.    6.  She must be is sensitive to cigarette smoke as she gets short of breath even if she is sitting in a car and smells it.  She also notices some shortness of breath when talking in long sentences.  I recommended further discussion with her PCP as this appears to be more of a pulmonary issue.    7.  Type 2 diabetes mellitus.    8.  I recommended a 1 year follow-up visit with Dr. Leyva.    As always, it has been a pleasure to participate in your patient's care. Thank you.         Sincerely,         PANKAJ Rebolledo  [facility] Cardiology      · Dictated utilizing Dragon Dictation  · COVID-19 Precautions - Patient was compliant in wearing a mask. When I saw the patient, I used appropriate personal protective equipment (PPE) including mask and eye shield (standard procedure).  Additionally, I used gown and gloves if indicated.  Hand hygiene was completed before and after seeing the patient.

## 2023-05-12 DIAGNOSIS — I10 PRIMARY HYPERTENSION: Primary | ICD-10-CM

## 2023-05-12 RX ORDER — SPIRONOLACTONE 25 MG/1
25 TABLET ORAL DAILY
Qty: 30 TABLET | Refills: 1 | Status: SHIPPED | OUTPATIENT
Start: 2023-05-12

## 2023-05-30 ENCOUNTER — LAB (OUTPATIENT)
Dept: LAB | Facility: HOSPITAL | Age: 53
End: 2023-05-30

## 2023-05-30 DIAGNOSIS — I10 PRIMARY HYPERTENSION: ICD-10-CM

## 2023-05-30 LAB
ANION GAP SERPL CALCULATED.3IONS-SCNC: 11 MMOL/L (ref 5–15)
BUN SERPL-MCNC: 19 MG/DL (ref 6–20)
BUN/CREAT SERPL: 23.2 (ref 7–25)
CALCIUM SPEC-SCNC: 10.3 MG/DL (ref 8.6–10.5)
CHLORIDE SERPL-SCNC: 102 MMOL/L (ref 98–107)
CO2 SERPL-SCNC: 26 MMOL/L (ref 22–29)
CREAT SERPL-MCNC: 0.82 MG/DL (ref 0.57–1)
EGFRCR SERPLBLD CKD-EPI 2021: 85.1 ML/MIN/1.73
GLUCOSE SERPL-MCNC: 144 MG/DL (ref 65–99)
HOLD SPECIMEN: NORMAL
POTASSIUM SERPL-SCNC: 5.3 MMOL/L (ref 3.5–5.2)
SODIUM SERPL-SCNC: 139 MMOL/L (ref 136–145)
WHOLE BLOOD HOLD SPECIMEN: NORMAL

## 2023-05-30 PROCEDURE — 80048 BASIC METABOLIC PNL TOTAL CA: CPT

## 2023-05-30 PROCEDURE — 36415 COLL VENOUS BLD VENIPUNCTURE: CPT

## 2023-05-30 NOTE — PROGRESS NOTES
Triage RN-BMP showed elevated glucose of 144 (may not of been fasting).  Potassium level 5.3-mildly elevated.  This is most likely due to the spironolactone.  Please make sure she is not taking any over-the-counter potassium supplements.  Decrease potassium rich foods-potatoes, fruit, bananas, etc.  We will recheck potassium on her next office visit.  Thank you

## 2024-05-09 ENCOUNTER — OFFICE VISIT (OUTPATIENT)
Dept: CARDIOLOGY | Facility: CLINIC | Age: 54
End: 2024-05-09
Payer: COMMERCIAL

## 2024-05-09 VITALS
BODY MASS INDEX: 44.57 KG/M2 | SYSTOLIC BLOOD PRESSURE: 136 MMHG | HEIGHT: 67 IN | WEIGHT: 284 LBS | HEART RATE: 64 BPM | DIASTOLIC BLOOD PRESSURE: 84 MMHG

## 2024-05-09 DIAGNOSIS — R01.1 HEART MURMUR: ICD-10-CM

## 2024-05-09 DIAGNOSIS — I25.810 CORONARY ARTERY DISEASE INVOLVING CORONARY BYPASS GRAFT OF NATIVE HEART WITHOUT ANGINA PECTORIS: Primary | ICD-10-CM

## 2024-05-09 DIAGNOSIS — E11.59 TYPE 2 DIABETES MELLITUS WITH OTHER CIRCULATORY COMPLICATION, WITHOUT LONG-TERM CURRENT USE OF INSULIN: ICD-10-CM

## 2024-05-09 DIAGNOSIS — E66.2 CLASS 3 OBESITY WITH ALVEOLAR HYPOVENTILATION, SERIOUS COMORBIDITY, AND BODY MASS INDEX (BMI) OF 40.0 TO 44.9 IN ADULT: ICD-10-CM

## 2024-05-09 DIAGNOSIS — Z95.1 S/P CABG (CORONARY ARTERY BYPASS GRAFT): ICD-10-CM

## 2024-05-09 DIAGNOSIS — I10 PRIMARY HYPERTENSION: ICD-10-CM

## 2024-05-09 DIAGNOSIS — E78.2 MIXED HYPERLIPIDEMIA: ICD-10-CM

## 2024-05-09 PROCEDURE — 93000 ELECTROCARDIOGRAM COMPLETE: CPT | Performed by: INTERNAL MEDICINE

## 2024-05-09 PROCEDURE — 99214 OFFICE O/P EST MOD 30 MIN: CPT | Performed by: INTERNAL MEDICINE

## 2024-05-13 ENCOUNTER — TELEPHONE (OUTPATIENT)
Dept: CARDIOLOGY | Facility: CLINIC | Age: 54
End: 2024-05-13
Payer: COMMERCIAL

## 2024-05-13 NOTE — TELEPHONE ENCOUNTER
Criss with [facility] called and stated that the patient is having a (L) breast biopsy done on 5/20/24.  She wanted to know if the patient needs the Echo done before the biopsy is done?  Please advise.    CB: [phone]  Fax: [phone]    Jake Renee

## 2024-05-17 ENCOUNTER — HOSPITAL ENCOUNTER (OUTPATIENT)
Dept: CARDIOLOGY | Facility: HOSPITAL | Age: 54
Discharge: HOME | End: 2024-05-17
Admitting: INTERNAL MEDICINE
Payer: COMMERCIAL

## 2024-05-17 VITALS
BODY MASS INDEX: 44.57 KG/M2 | WEIGHT: 284 LBS | HEIGHT: 67 IN | OXYGEN SATURATION: 97 % | HEART RATE: 78 BPM | DIASTOLIC BLOOD PRESSURE: 84 MMHG | SYSTOLIC BLOOD PRESSURE: 140 MMHG

## 2024-05-17 DIAGNOSIS — I25.810 CORONARY ARTERY DISEASE INVOLVING CORONARY BYPASS GRAFT OF NATIVE HEART WITHOUT ANGINA PECTORIS: ICD-10-CM

## 2024-05-17 DIAGNOSIS — R01.1 HEART MURMUR: ICD-10-CM

## 2024-05-17 PROCEDURE — 93306 TTE W/DOPPLER COMPLETE: CPT

## 2024-05-17 PROCEDURE — 25510000001 PERFLUTREN (DEFINITY) 8.476 MG IN SODIUM CHLORIDE (PF) 0.9 % 10 ML INJECTION: Performed by: INTERNAL MEDICINE

## 2024-05-17 RX ADMIN — PERFLUTREN 2 ML: 6.52 INJECTION, SUSPENSION INTRAVENOUS at 07:25

## 2024-05-18 ENCOUNTER — TELEPHONE (OUTPATIENT)
Dept: CARDIOLOGY | Facility: CLINIC | Age: 54
End: 2024-05-18
Payer: COMMERCIAL

## 2024-05-18 LAB
AORTIC ARCH: 2.6 CM
AORTIC DIMENSIONLESS INDEX: 0.7 (DI)
ASCENDING AORTA: 3.4 CM
BH CV ECHO MEAS - ACS: 2.03 CM
BH CV ECHO MEAS - AO MAX PG: 10.5 MMHG
BH CV ECHO MEAS - AO MEAN PG: 5 MMHG
BH CV ECHO MEAS - AO ROOT DIAM: 3.5 CM
BH CV ECHO MEAS - AO V2 MAX: 162 CM/SEC
BH CV ECHO MEAS - AO V2 VTI: 32 CM
BH CV ECHO MEAS - AVA(I,D): 1.89 CM2
BH CV ECHO MEAS - EDV(CUBED): 151.3 ML
BH CV ECHO MEAS - EDV(MOD-SP2): 90 ML
BH CV ECHO MEAS - EDV(MOD-SP4): 119 ML
BH CV ECHO MEAS - EF(MOD-BP): 67.3 %
BH CV ECHO MEAS - EF(MOD-SP2): 63.3 %
BH CV ECHO MEAS - EF(MOD-SP4): 68.9 %
BH CV ECHO MEAS - ESV(CUBED): 34.3 ML
BH CV ECHO MEAS - ESV(MOD-SP2): 33 ML
BH CV ECHO MEAS - ESV(MOD-SP4): 37 ML
BH CV ECHO MEAS - FS: 39 %
BH CV ECHO MEAS - IVS/LVPW: 1.07 CM
BH CV ECHO MEAS - IVSD: 1.1 CM
BH CV ECHO MEAS - LAT PEAK E' VEL: 11.6 CM/SEC
BH CV ECHO MEAS - LV MASS(C)D: 219.6 GRAMS
BH CV ECHO MEAS - LV MAX PG: 4.1 MMHG
BH CV ECHO MEAS - LV MEAN PG: 2 MMHG
BH CV ECHO MEAS - LV V1 MAX: 101 CM/SEC
BH CV ECHO MEAS - LV V1 VTI: 22.5 CM
BH CV ECHO MEAS - LVIDD: 5.3 CM
BH CV ECHO MEAS - LVIDS: 3.2 CM
BH CV ECHO MEAS - LVOT AREA: 2.7 CM2
BH CV ECHO MEAS - LVOT DIAM: 1.85 CM
BH CV ECHO MEAS - LVPWD: 1.03 CM
BH CV ECHO MEAS - MED PEAK E' VEL: 9.7 CM/SEC
BH CV ECHO MEAS - MV A DUR: 0.12 SEC
BH CV ECHO MEAS - MV A MAX VEL: 86.3 CM/SEC
BH CV ECHO MEAS - MV DEC SLOPE: 192.6 CM/SEC2
BH CV ECHO MEAS - MV DEC TIME: 0.29 SEC
BH CV ECHO MEAS - MV E MAX VEL: 67.3 CM/SEC
BH CV ECHO MEAS - MV E/A: 0.78
BH CV ECHO MEAS - MV MAX PG: 2.8 MMHG
BH CV ECHO MEAS - MV MEAN PG: 1.22 MMHG
BH CV ECHO MEAS - MV P1/2T: 91.1 MSEC
BH CV ECHO MEAS - MV V2 VTI: 28 CM
BH CV ECHO MEAS - MVA(P1/2T): 2.41 CM2
BH CV ECHO MEAS - MVA(VTI): 2.16 CM2
BH CV ECHO MEAS - PA ACC TIME: 0.09 SEC
BH CV ECHO MEAS - PA V2 MAX: 95 CM/SEC
BH CV ECHO MEAS - PULM A REVS DUR: 0.09 SEC
BH CV ECHO MEAS - PULM A REVS VEL: 31 CM/SEC
BH CV ECHO MEAS - PULM DIAS VEL: 39.9 CM/SEC
BH CV ECHO MEAS - PULM S/D: 0.94
BH CV ECHO MEAS - PULM SYS VEL: 37.7 CM/SEC
BH CV ECHO MEAS - QP/QS: 1.05
BH CV ECHO MEAS - RAP SYSTOLE: 3 MMHG
BH CV ECHO MEAS - RV MAX PG: 2.8 MMHG
BH CV ECHO MEAS - RV V1 MAX: 84 CM/SEC
BH CV ECHO MEAS - RV V1 VTI: 18.6 CM
BH CV ECHO MEAS - RVOT DIAM: 2.09 CM
BH CV ECHO MEAS - RVSP: 20 MMHG
BH CV ECHO MEAS - SUP REN AO DIAM: 2 CM
BH CV ECHO MEAS - SV(LVOT): 60.5 ML
BH CV ECHO MEAS - SV(MOD-SP2): 57 ML
BH CV ECHO MEAS - SV(MOD-SP4): 82 ML
BH CV ECHO MEAS - SV(RVOT): 63.7 ML
BH CV ECHO MEAS - TAPSE (>1.6): 1.94 CM
BH CV ECHO MEAS - TR MAX PG: 16.6 MMHG
BH CV ECHO MEAS - TR MAX VEL: 203.6 CM/SEC
BH CV ECHO MEASUREMENTS AVERAGE E/E' RATIO: 6.32
BH CV VAS BP LEFT ARM: NORMAL MMHG
BH CV XLRA - RV BASE: 3 CM
BH CV XLRA - RV LENGTH: 7.4 CM
BH CV XLRA - RV MID: 3.4 CM
BH CV XLRA - TDI S': 9.7 CM/SEC
LEFT ATRIUM VOLUME INDEX: 22.3 ML/M2
SINUS: 3.1 CM
STJ: 3 CM

## 2024-05-20 NOTE — TELEPHONE ENCOUNTER
Called and left VM, will continue to try to reach pt.    HUB- please put patient straight through to triage    Linh Bray RN  Triage RN  05/20/24 09:31 EDT

## 2025-05-15 ENCOUNTER — OFFICE VISIT (OUTPATIENT)
Dept: CARDIOLOGY | Age: 55
End: 2025-05-15
Payer: COMMERCIAL

## 2025-05-15 VITALS
BODY MASS INDEX: 45.26 KG/M2 | DIASTOLIC BLOOD PRESSURE: 80 MMHG | HEART RATE: 60 BPM | OXYGEN SATURATION: 96 % | WEIGHT: 289 LBS | SYSTOLIC BLOOD PRESSURE: 120 MMHG

## 2025-05-15 DIAGNOSIS — E78.2 MIXED HYPERLIPIDEMIA: ICD-10-CM

## 2025-05-15 DIAGNOSIS — Z95.1 S/P CABG (CORONARY ARTERY BYPASS GRAFT): ICD-10-CM

## 2025-05-15 DIAGNOSIS — I10 PRIMARY HYPERTENSION: ICD-10-CM

## 2025-05-15 DIAGNOSIS — I25.10 CORONARY ARTERY DISEASE INVOLVING NATIVE CORONARY ARTERY OF NATIVE HEART WITHOUT ANGINA PECTORIS: Primary | ICD-10-CM

## 2025-05-15 PROCEDURE — 93000 ELECTROCARDIOGRAM COMPLETE: CPT | Performed by: FAMILY MEDICINE

## 2025-05-15 PROCEDURE — 99214 OFFICE O/P EST MOD 30 MIN: CPT | Performed by: FAMILY MEDICINE

## 2025-05-15 NOTE — PROGRESS NOTES
Date of Office Visit: 05/15/2025  Encounter Provider: PANKAJ Castelan  Place of Service: [facility] CARDIOLOGY  Established cardiologist: Monica Leyva MD  Patient Name: Consuelo Goodrich  :1970      Patient ID:  Consuelo Goodrich is a 54 y.o. female is here for  followup    With a pertinent medical history of coronary artery disease status post CABG hypertension hyperlipidemia obesity diabetes.    In her family history, her mom and dad both have hypertension as her grandparents. Her dad also has diabetes. She is , has 1 child, works as a , quit smoking , has 1 caffeinated beverage per day and uses no alcohol or drugs.     History of Present Illness   stress nuclear study performed showed mid and distal anteroseptal and apical ischemia.  She went on for LHC 2017 that showed critical ostial LAD stenosis with 80% mid LAD stenosis patent circumflex and left main, 30% first OM stenosis 40 to 50% mid second OM stenosis codominant circumflex and RCA 50% mid RCA stenosis.  Echo 2017 with EF 57% G1 DD.  She underwent 1V CABG receiving LIMA to mid LAD at [facility].    She had a left breast mass removed with Dr. Chelsie Bui at [facility] in .  She was told that this is benign.     She last saw Dr. Leyva in the office 2024, she was stable no medication changes were made, an echocardiogram was ordered for murmur on exam.    Echocardiogram done 2024 this showed an ejection fraction of 67.3%, sclerotic aortic valve, mitral annular calcification present.     Consuelo presents for follow-up.  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  In 2017 when she had her abnormal stress study her first symptoms were aching in her left arm and increased belching.  She has had no recurrence of the symptoms since that time.  She would like to increase  her routine physical exercise she has a under the desk cycler she uses when she is working from home.  She also likes using the [employer] exercise and doing yoga.  She has not had any chest pain or pressure, shortness of breath, ESPINOSA, PND, lightheadedness, dizziness, presyncope/syncope, heart racing or palpitations.  No edema.      Current Outpatient Medications on File Prior to Visit   Medication Sig Dispense Refill    amLODIPine (NORVASC) 5 MG tablet Take 2 tablets by mouth Daily.      aspirin 81 MG chewable tablet Chew 1 tablet Daily.      atorvastatin (LIPITOR) 40 MG tablet Take 1 tablet by mouth Daily.      Jardiance 25 MG tablet tablet Take 1 tablet by mouth Daily.      metoprolol succinate XL (TOPROL-XL) 25 MG 24 hr tablet Take 4 tablets by mouth Daily.      [DISCONTINUED] Jentadueto 2.5-1000 MG tablet Take 2 tablets by mouth Daily.      [DISCONTINUED] spironolactone (ALDACTONE) 25 MG tablet TAKE 1 TABLET BY MOUTH EVERY DAY 30 tablet 11     No current facility-administered medications on file prior to visit.         Procedures    ECG 12 Lead    Date/Time: 5/15/2025 2:40 PM  Performed by: Rosetta Leal APRN    Authorized by: Rosetta Leal APRN  Comparison: compared with previous ECG from 5/9/2024  Rhythm: sinus rhythm  BPM: 60              Objective:      Vitals:    05/15/25 1411   BP: 120/80   Pulse: 60   SpO2: 96%   Weight: 131 kg (289 lb)     Body mass index is 45.26 kg/m².  Wt Readings from Last 3 Encounters:   05/15/25 131 kg (289 lb)   05/17/24 129 kg (284 lb)   05/09/24 129 kg (284 lb)         Constitutional:       Comments: Sebastian is a 54-year-old  female she is obese, otherwise well-appearing and in no acute distress   Eyes:      Pupils: Pupils are equal, round, and reactive to light.   Neck:      Vascular: No JVD.   Pulmonary:      Effort: Pulmonary effort is normal.      Breath sounds: Normal breath sounds.   Cardiovascular:      Normal rate. Regular rhythm.      Murmurs: There is no  murmur.   Pulses:     Intact distal pulses.   Edema:     Peripheral edema absent.   Abdominal:      General: Bowel sounds are normal.      Palpations: Abdomen is soft.   Musculoskeletal:      Cervical back: Normal range of motion. Skin:     General: Skin is warm and dry.      Comments: She is not diaphoretic.   Neurological:      Mental Status: Alert and oriented to person, place and time.   Psychiatric:         Speech: Speech normal.       Lab Review:   5/14/2025 total cholesterol 132  HDL 45 LDL 60.   mild transaminitis otherwise unremarkable CMP.  Her hemoglobin A1c is 7.7.  These labs were completed with [facility]    Assessment:     1. Coronary artery disease involving native coronary artery of native heart without angina pectoris    2. S/P CABG (coronary artery bypass graft)    3. Primary hypertension    4. Mixed hyperlipidemia      CAD, s/p CABG in 2017, no angina.    Hypertension, goal less than 120/80.  Her blood pressure has been well controlled.  Hyperlipidemia, on atorvastatin.  Diabetes mellitus type 2, on  Jardiance.  Obesity, advised exercise   Murmur from mitral annular calcification, aortic sclerosis no other significant valvular pathology on TTE 5/2024.    Plan:   No medication changes were made  Consuelo is doing well today  She is planning to increase her physical exercise regimen and is encouraged to continue with that goal  She may see Dr. Leyva in 1 year    Thank you for allowing me to participate in this patient's care. Please call with any questions or concerns.          Dragon dictation device was utilized in this note.